# Patient Record
(demographics unavailable — no encounter records)

---

## 2024-12-16 NOTE — ASSESSMENT
[FreeTextEntry1] : PE in September 2022 likely unprovoked. On ELiquis 5 mg BID SP COVID 19 infection in June 2022 Nodular lesion on breast sp work up with PCP.

## 2024-12-16 NOTE — HISTORY OF PRESENT ILLNESS
[Follow-Up - From Hospitalization] : follow-up of a hospitalization for [Shortness of Breath] : shortness of breath [Cough] : no cough [Orthopnea] : no orthopnea [Fever] : no fever [Palpitations] : no palpitations [Anxiety] : no anxiety [Syncope] : no syncope [Currently Experiencing] : The patient is currently experiencing symptoms. [None] : No associated symptoms are reported